# Patient Record
Sex: MALE | Race: WHITE
[De-identification: names, ages, dates, MRNs, and addresses within clinical notes are randomized per-mention and may not be internally consistent; named-entity substitution may affect disease eponyms.]

---

## 2018-01-11 NOTE — PCM.EKG
[facility]

                                       

Test Date:    2018               Test Time:    15:39:20

Pat Name:     KETURAH AWAD          Department:   

Patient ID:   [id number]          Room:          

Gender:       M                        Technician:   AWLVN

:          1955               Requested By: NORY VASQUEZ

Order Number: [id number]            Reading MD:   Perla Bailey

                                 Measurements

Intervals                              Axis          

Rate:         81                       P:            73

SC:           170                      QRS:          51

QRSD:         86                       T:            62

QT:           392                                    

QTc:          455                                    

                           Interpretive Statements

Normal sinus rhythm

Normal ECG

No previous ECG available for comparison



NONSPECIFIC T ABNORMALITIES, DIFFUSE LEADS

Electronically Signed On 1- 10:38:01 CST by Perla Bailey



Please click the below link to view image of tracing.

## 2018-01-12 NOTE — OPH
DATE OF SURGERY:  01/12/2018



PREOPERATIVE DIAGNOSIS:  Rotator cuff tear of the right shoulder.



POSTOPERATIVE DIAGNOSIS:  Rotator cuff tear of the right shoulder.



OPERATIVE PROCEDURE:

1.  Arthroscopy of the right shoulder with acromioplasty.

2.  Through a separate incision, an open rotator cuff repair of the right

shoulder.



SURGEON:  Waylon Patel MD



ANESTHESIA:  General endotracheal



BLOOD LOSS:  100 mL



DRAINS:  None.



DESCRIPTION OF INDICATIONS:  The patient is a 62-year-old male who injured his

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  He

fell and was unable to move the shoulder.  Prior to that, he had no previous

problems with the shoulder.  The patient continued to have severe pain as well

as weakness and limited range of motion.



The patient's x-rays were negative for any arthritic changes or fractures.



The MRI scan showed that he had a large rotator cuff tear about the

supraspinatus and infraspinatus tendons.



He was taken to the operating room for an arthroscopy with rotator cuff repair.



The patient was placed on the operating table in the supine position.  A general

endotracheal anesthetic was induced without difficulty.  The patient was then

placed in the beachchair position.  The arthroscope was introduced posteriorly. 

The glenohumeral joint was viewed, he had no arthritic changes.  The biceps

tendon was intact.  He had no labral tears.  From the articular side, he was

noted to have U-shaped tear of the anterior portion of the supraspinatus and a

small portion of the infraspinatus tendon.



The arthroscope was then placed in the subacromial space.  Extensive bursectomy

was performed.  The patient then had acromioplasty performed with an oval bur. 

The patient's rotator cuff tear was viewed from the bursal side.



The patient then had the arthroscopic equipment removed from the shoulder.  An

anterolateral incision was made about the tip of the acromion.  The incision was

taken through the skin and the subcutaneous tissues.  Full thickness flaps were

developed.  The deltoid was taken off of the anterolateral tip of the acromion

with cautery.  The patient then had any bursal tissue that was remaining

resected with a rongeur.  The main portion of the tendon was retracted

posteriorly.  A #2 Ethibond suture was used to tag the rotator cuff edge and

then we used a [place] elevator to free up the tendon on the dorsal as well as

ventral surfaces.  We were able to advance the tendon easily back down to the

rotator cuff footprint.



The patient then had two Arthrex suture anchors that were loaded with a #2

FiberWire and placed into the rotator cuff footprint at the junction of the

footprint and the articular surface.  The rotator cuff footprint had been

freshened up with a rongeur as well as a curette.



Sutures were then placed through the tendon.  We used two PushLock suture

anchors to secure the tendon in a double bundle fashion.  The other sutures were

then tied directly over the tendon to reinforce the repair.



The patient had good range of motion about the shoulder with no tension on the

suture line with the arm to the side.  The wounds were then copiously irrigated.

 We used #2 Ethibond suture in an interrupted figure-of-eight manner to repair

the deltoid back to the acromion.  The patient then had the deltoid split closed

with 0 Vicryl in a running manner, the subQ was closed with a 2-0 barbed

Monocryl in a subcuticular manner, and the wound was then closed with skin glue.

 The 2 portal tracts were closed with 3-0 Ethilon in an interrupted manner.  The

patient had the wound covered with an Aquacel dressing once the glue had dried.



The patient then had his arm placed in a shoulder immobilizer.  He was extubated

in the operating room and sent to recovery in a stable condition.





______________________________

Waylon Patel MD



DR:  SOFIA/jean claude  JOB# [id number]  [id number]

DD:  01/12/2018 11:59  DT:  01/12/2018 12:56

## 2022-01-13 ENCOUNTER — HOSPITAL ENCOUNTER (OUTPATIENT)
Dept: HOSPITAL 65 - LAB | Age: 67
Discharge: HOME | End: 2022-01-13
Attending: INTERNAL MEDICINE
Payer: MEDICARE

## 2022-01-13 DIAGNOSIS — E78.5: ICD-10-CM

## 2022-01-13 DIAGNOSIS — Z12.5: Primary | ICD-10-CM

## 2022-01-13 PROCEDURE — 36415 COLL VENOUS BLD VENIPUNCTURE: CPT

## 2022-01-13 PROCEDURE — 84153 ASSAY OF PSA TOTAL: CPT

## 2022-01-13 PROCEDURE — 80061 LIPID PANEL: CPT
